# Patient Record
Sex: FEMALE | Race: OTHER | URBAN - METROPOLITAN AREA
[De-identification: names, ages, dates, MRNs, and addresses within clinical notes are randomized per-mention and may not be internally consistent; named-entity substitution may affect disease eponyms.]

---

## 2022-04-11 ENCOUNTER — OFFICE VISIT (OUTPATIENT)
Dept: FAMILY MEDICINE CLINIC | Age: 32
End: 2022-04-11
Payer: COMMERCIAL

## 2022-04-11 VITALS
WEIGHT: 126 LBS | HEIGHT: 62 IN | DIASTOLIC BLOOD PRESSURE: 86 MMHG | BODY MASS INDEX: 23.19 KG/M2 | OXYGEN SATURATION: 99 % | SYSTOLIC BLOOD PRESSURE: 138 MMHG | HEART RATE: 134 BPM

## 2022-04-11 DIAGNOSIS — E03.9 ACQUIRED HYPOTHYROIDISM: ICD-10-CM

## 2022-04-11 DIAGNOSIS — Z00.00 ANNUAL PHYSICAL EXAM: Primary | ICD-10-CM

## 2022-04-11 PROCEDURE — 99385 PREV VISIT NEW AGE 18-39: CPT | Performed by: FAMILY MEDICINE

## 2022-04-11 SDOH — ECONOMIC STABILITY: FOOD INSECURITY: WITHIN THE PAST 12 MONTHS, THE FOOD YOU BOUGHT JUST DIDN'T LAST AND YOU DIDN'T HAVE MONEY TO GET MORE.: NEVER TRUE

## 2022-04-11 SDOH — ECONOMIC STABILITY: FOOD INSECURITY: WITHIN THE PAST 12 MONTHS, YOU WORRIED THAT YOUR FOOD WOULD RUN OUT BEFORE YOU GOT MONEY TO BUY MORE.: NEVER TRUE

## 2022-04-11 ASSESSMENT — PATIENT HEALTH QUESTIONNAIRE - PHQ9
1. LITTLE INTEREST OR PLEASURE IN DOING THINGS: 0
SUM OF ALL RESPONSES TO PHQ QUESTIONS 1-9: 0
2. FEELING DOWN, DEPRESSED OR HOPELESS: 0
SUM OF ALL RESPONSES TO PHQ QUESTIONS 1-9: 0
SUM OF ALL RESPONSES TO PHQ9 QUESTIONS 1 & 2: 0

## 2022-04-11 ASSESSMENT — SOCIAL DETERMINANTS OF HEALTH (SDOH): HOW HARD IS IT FOR YOU TO PAY FOR THE VERY BASICS LIKE FOOD, HOUSING, MEDICAL CARE, AND HEATING?: NOT HARD AT ALL

## 2022-04-11 NOTE — PROGRESS NOTES
Chief Complaint: New Patient       HPI: Here for establishing. She has history of hypothyroidism and she has been taking Synthroid 125 mcg. She has got this medication from [facility]. Last lab work normal    Denies any concerns today   She is due for GYN exam.  But does not want to get it done today      Patient's problem list, medications, allergies, past medical, surgical, social and family histories were reviewed and updated as appropriate. Current Outpatient Medications   Medication Sig Dispense Refill    Multiple Vitamins-Minerals (MULTIVITAL-M PO) Take by mouth       No current facility-administered medications for this visit. Social History     Tobacco Use    Smoking status: Never Smoker    Smokeless tobacco: Never Used   Substance Use Topics    Alcohol use: Not on file            Review of Systems:  Constitutional: Negative for appetite change, fatigue, fever and unexpected weight change. HENT: Negative  Respiratory: Negative for cough, chest tightness, shortness of breath and wheezing. Cardiovascular: Negative for chest pain, palpitations and leg swelling. Gastrointestinal: Negative for abdominal pain, blood in stool, constipation, diarrhea, nausea and vomiting. Genitourinary: Negative for difficulty urinating, flank pain, frequency, hematuria and urgency. Musculoskeletal: Negative for gait problem, joint swelling and myalgias. Skin: Negative for color change, pallor, rash and wound. Neurological: Negative for dizziness, tremors, seizures, syncope, facial asymmetry, speech difficulty, weakness, light-headedness, numbness and headaches. Psychiatric/Behavioral: Negative for agitation, confusion, self-injury, sleep disturbance and suicidal ideas. The patient is not nervous/anxious.         Objective:     Vitals:    04/11/22 1133   BP: 138/86   Site: Right Upper Arm   Position: Sitting   Cuff Size: Medium Adult   Pulse: 134   SpO2: 99%   Weight: 126 lb (57.2 kg)   Height: 5' 2\" (1.575 m)     Body mass index is 23.05 kg/m². Wt Readings from Last 3 Encounters:   04/11/22 126 lb (57.2 kg)     BP Readings from Last 3 Encounters:   04/11/22 138/86       Physical exam:  Constitutional: she is oriented to person, place, and time. she appears well-developed and well-nourished. No distress. HENT:   Head: Normocephalic. Right Ear: External ear normal. Normal TM   Left Ear: External ear normal. Normal TM  Nose: Nose normal.   Mouth/Throat: Oropharynx is clear and moist. No oropharyngeal exudate. Eyes: Conjunctivae and EOM are normal. Pupils are equal, round, and reactive to light. Right eye exhibits no discharge. Left eye exhibits no discharge. No scleral icterus. Neck: Normal range of motion. No JVD present. No tracheal deviation present. No thyromegaly present. Cardiovascular: Normal rate, regular rhythm, normal heart sounds and intact distal pulses. No murmur heard. Pulmonary/Chest: Effort normal and breath sounds normal. No stridor. No respiratory distress. she has no wheezes. she has no rales. sheexhibits no tenderness. Abdominal: Soft. Bowel sounds are normal. she exhibits no distension and no mass. There is no tenderness. There is no rebound and no guarding. Musculoskeletal: Normal range of motion. she exhibits no edema, tenderness or deformity. Lymphadenopathy:     she has no cervical adenopathy. Neurological:she is alert and oriented to person, place, and time. she  has normal reflexes. No cranial nerve deficit. Coordination normal.   Skin: Skin is warm and dry. No rash noted. she is not diaphoretic. No erythema. No pallor. Psychiatric: she has a normal mood and affect.  her   behavior is normal.       Health Maintenance   Topic Date Due    Hepatitis C screen  Never done    Varicella vaccine (1 of 2 - 2-dose childhood series) Never done    COVID-19 Vaccine (1) Never done    Depression Screen  Never done    HIV screen  Never done    DTaP/Tdap/Td vaccine (1 - Tdap) Never done    Cervical cancer screen  Never done    Flu vaccine (Season Ended) 09/01/2022    Hepatitis A vaccine  Aged Out    Hepatitis B vaccine  Aged Out    Hib vaccine  Aged Out    Meningococcal (ACWY) vaccine  Aged Out    Pneumococcal 0-64 years Vaccine  Aged Out          Assessment/Plan:     1. Annual physical exam  - CBC with Auto Differential; Future  - Basic Metabolic Panel; Future  - Lipid Panel; Future    2. Acquired hypothyroidism  Currently taking synthroid 125 mcg daily   - TSH with Reflex;  Future         Tex Aguila MD  4/11/2022 11:59 AM

## 2022-04-13 DIAGNOSIS — Z00.00 ANNUAL PHYSICAL EXAM: ICD-10-CM

## 2022-04-13 DIAGNOSIS — E03.9 ACQUIRED HYPOTHYROIDISM: ICD-10-CM

## 2022-04-13 LAB
ANION GAP SERPL CALCULATED.3IONS-SCNC: 17 MMOL/L (ref 3–16)
ANISOCYTOSIS: ABNORMAL
BASOPHILS ABSOLUTE: 0 K/UL (ref 0–0.2)
BASOPHILS RELATIVE PERCENT: 0.4 %
BUN BLDV-MCNC: 8 MG/DL (ref 7–20)
CALCIUM SERPL-MCNC: 9.8 MG/DL (ref 8.3–10.6)
CHLORIDE BLD-SCNC: 101 MMOL/L (ref 99–110)
CHOLESTEROL, TOTAL: 191 MG/DL (ref 0–199)
CO2: 20 MMOL/L (ref 21–32)
CREAT SERPL-MCNC: <0.5 MG/DL (ref 0.6–1.1)
EOSINOPHILS ABSOLUTE: 0.2 K/UL (ref 0–0.6)
EOSINOPHILS RELATIVE PERCENT: 3.7 %
GFR AFRICAN AMERICAN: >60
GFR NON-AFRICAN AMERICAN: >60
GLUCOSE BLD-MCNC: 94 MG/DL (ref 70–99)
HCT VFR BLD CALC: 33.3 % (ref 36–48)
HDLC SERPL-MCNC: 38 MG/DL (ref 40–60)
HEMATOLOGY PATH CONSULT: YES
HEMOGLOBIN: 10.2 G/DL (ref 12–16)
LDL CHOLESTEROL CALCULATED: 128 MG/DL
LYMPHOCYTES ABSOLUTE: 1.5 K/UL (ref 1–5.1)
LYMPHOCYTES RELATIVE PERCENT: 31.5 %
MCH RBC QN AUTO: 18.3 PG (ref 26–34)
MCHC RBC AUTO-ENTMCNC: 30.5 G/DL (ref 31–36)
MCV RBC AUTO: 60.2 FL (ref 80–100)
MICROCYTES: ABNORMAL
MONOCYTES ABSOLUTE: 0.3 K/UL (ref 0–1.3)
MONOCYTES RELATIVE PERCENT: 7.1 %
NEUTROPHILS ABSOLUTE: 2.8 K/UL (ref 1.7–7.7)
NEUTROPHILS RELATIVE PERCENT: 57.3 %
OVALOCYTES: ABNORMAL
PDW BLD-RTO: 16.7 % (ref 12.4–15.4)
PLATELET # BLD: 253 K/UL (ref 135–450)
PMV BLD AUTO: 8.6 FL (ref 5–10.5)
POIKILOCYTES: ABNORMAL
POTASSIUM SERPL-SCNC: 4.5 MMOL/L (ref 3.5–5.1)
RBC # BLD: 5.54 M/UL (ref 4–5.2)
SCHISTOCYTES: ABNORMAL
SODIUM BLD-SCNC: 138 MMOL/L (ref 136–145)
TRIGL SERPL-MCNC: 124 MG/DL (ref 0–150)
VLDLC SERPL CALC-MCNC: 25 MG/DL
WBC # BLD: 4.9 K/UL (ref 4–11)

## 2022-04-14 LAB
HEMATOLOGY PATH CONSULT: NORMAL
T3 TOTAL: 0.97 NG/ML (ref 0.8–2)
T4 FREE: 1.6 NG/DL (ref 0.9–1.8)
TSH REFLEX: 0.03 UIU/ML (ref 0.27–4.2)

## 2022-04-14 RX ORDER — LEVOTHYROXINE SODIUM 0.1 MG/1
100 TABLET ORAL DAILY
Qty: 90 TABLET | Refills: 1 | COMMUNITY
Start: 2022-04-14

## 2024-03-16 SDOH — HEALTH STABILITY: PHYSICAL HEALTH: ON AVERAGE, HOW MANY DAYS PER WEEK DO YOU ENGAGE IN MODERATE TO STRENUOUS EXERCISE (LIKE A BRISK WALK)?: 3 DAYS

## 2024-03-16 SDOH — HEALTH STABILITY: PHYSICAL HEALTH: ON AVERAGE, HOW MANY MINUTES DO YOU ENGAGE IN EXERCISE AT THIS LEVEL?: 30 MIN

## 2024-03-18 ENCOUNTER — OFFICE VISIT (OUTPATIENT)
Dept: PRIMARY CARE CLINIC | Age: 34
End: 2024-03-18
Payer: COMMERCIAL

## 2024-03-18 VITALS
SYSTOLIC BLOOD PRESSURE: 158 MMHG | OXYGEN SATURATION: 97 % | BODY MASS INDEX: 24.17 KG/M2 | HEIGHT: 61 IN | RESPIRATION RATE: 12 BRPM | WEIGHT: 128 LBS | HEART RATE: 128 BPM | TEMPERATURE: 97.6 F | DIASTOLIC BLOOD PRESSURE: 70 MMHG

## 2024-03-18 DIAGNOSIS — I10 ESSENTIAL HYPERTENSION: Primary | Chronic | ICD-10-CM

## 2024-03-18 DIAGNOSIS — E61.1 IRON DEFICIENCY: Chronic | ICD-10-CM

## 2024-03-18 DIAGNOSIS — E03.9 ACQUIRED HYPOTHYROIDISM: ICD-10-CM

## 2024-03-18 DIAGNOSIS — N92.6 MENSTRUAL BLEEDING PROBLEM: Chronic | ICD-10-CM

## 2024-03-18 PROCEDURE — 3078F DIAST BP <80 MM HG: CPT | Performed by: INTERNAL MEDICINE

## 2024-03-18 PROCEDURE — 3077F SYST BP >= 140 MM HG: CPT | Performed by: INTERNAL MEDICINE

## 2024-03-18 PROCEDURE — 99214 OFFICE O/P EST MOD 30 MIN: CPT | Performed by: INTERNAL MEDICINE

## 2024-03-18 RX ORDER — METOPROLOL SUCCINATE 50 MG/1
50 TABLET, EXTENDED RELEASE ORAL DAILY
Qty: 30 TABLET | Refills: 5 | Status: SHIPPED | OUTPATIENT
Start: 2024-03-18

## 2024-03-18 ASSESSMENT — PATIENT HEALTH QUESTIONNAIRE - PHQ9
2. FEELING DOWN, DEPRESSED OR HOPELESS: NOT AT ALL
SUM OF ALL RESPONSES TO PHQ QUESTIONS 1-9: 0
1. LITTLE INTEREST OR PLEASURE IN DOING THINGS: NOT AT ALL
SUM OF ALL RESPONSES TO PHQ QUESTIONS 1-9: 0
SUM OF ALL RESPONSES TO PHQ QUESTIONS 1-9: 0
SUM OF ALL RESPONSES TO PHQ9 QUESTIONS 1 & 2: 0
SUM OF ALL RESPONSES TO PHQ QUESTIONS 1-9: 0

## 2024-03-18 NOTE — ASSESSMENT & PLAN NOTE
On synthroid,  Patient is compliant w medications, no side effects, effective, provides adequate symptom relief. No new symptoms or problems as noted by patient.  The problem is stable, no changes noted by patient. Will consider monitoring labs and refill medications as appropriate. Patient counseled and will continue current plan.

## 2024-03-18 NOTE — PROGRESS NOTES
Subjective:      Patient ID: Khai Tatum is a 33 y.o. female.    HPI  Established patient here for a visit to manage acute and chronic medical conditions as detailed below.    Essential hypertension  This is a longstanding problem,  Was on metoprolol ,   Hasn't been taking it,   Counseled,   Counseled patient regarding diet and lifestyle modification.    Patient counseled,   Encouraged to lose weight, watch diet and exercise consistently.    Will restart metoprolol,            Acquired hypothyroidism  On synthroid,  Patient is compliant w medications, no side effects, effective, provides adequate symptom relief. No new symptoms or problems as noted by patient.  The problem is stable, no changes noted by patient. Will consider monitoring labs and refill medications as appropriate. Patient counseled and will continue current plan.      Iron deficiency  Not taking iron   Counseled.   Will check blood work     Menstrual bleeding problem  Will refer to gynec.     Review of Systems  ROS: No unusual headaches or allergy symptoms or blurred vision. No prolonged cough. No flushing or facial pain or chest pain,dizziness, dyspnea, palpitations, or chest pain on exertion. No syncope. No nausea or vommitting or diarrhea.  No jaundice or abdominal pain, change in bowel habits, black or bloody stools.  No dysuria or hematuria or frequency of urination.   No myalgias or muscle pain.  No numbness, weakness, or tingling. No falls, or loss of consciousness. No weight loss or back pain. No falls.  No paresthesias. No joint swelling or redness. No joint pain. No recent weight loss. No focal weakness or sensory deficits or paresthesias, No confusion or altered sensorium. No hematemesis. No hearing loss. No siezures. All other systems were reviewed, and review was negative.   Objective:   Physical Exam  BP (!) 158/70 (Site: Left Upper Arm, Position: Sitting, Cuff Size: Medium Adult)   Pulse (!) 128   Temp 97.6 °F (36.4 °C)   Resp 12

## 2024-03-18 NOTE — ASSESSMENT & PLAN NOTE
This is a longstanding problem,  Was on metoprolol ,   Hasn't been taking it,   Counseled,   Counseled patient regarding diet and lifestyle modification.    Patient counseled,   Encouraged to lose weight, watch diet and exercise consistently.    Will restart metoprolol,

## 2024-10-25 RX ORDER — METOPROLOL SUCCINATE 50 MG/1
50 TABLET, EXTENDED RELEASE ORAL DAILY
Qty: 30 TABLET | Refills: 0 | Status: SHIPPED | OUTPATIENT
Start: 2024-10-25

## 2024-10-25 NOTE — TELEPHONE ENCOUNTER
Medication:   Requested Prescriptions     Pending Prescriptions Disp Refills    metoprolol succinate (TOPROL XL) 50 MG extended release tablet [Pharmacy Med Name: Metoprolol Succinate ER 50 MG Oral Tablet Extended Release 24 Hour] 30 tablet 0     Sig: Take 1 tablet by mouth once daily     Last Filled:  03/18/2024    Last appt: 3/18/2024   Next appt: 11/1/2024    Last OARRS:        No data to display

## 2024-11-01 ENCOUNTER — OFFICE VISIT (OUTPATIENT)
Dept: PRIMARY CARE CLINIC | Age: 34
End: 2024-11-01
Payer: COMMERCIAL

## 2024-11-01 VITALS
OXYGEN SATURATION: 99 % | HEIGHT: 61 IN | DIASTOLIC BLOOD PRESSURE: 72 MMHG | SYSTOLIC BLOOD PRESSURE: 130 MMHG | BODY MASS INDEX: 24.81 KG/M2 | TEMPERATURE: 97.1 F | HEART RATE: 64 BPM | WEIGHT: 131.4 LBS

## 2024-11-01 DIAGNOSIS — E03.9 ACQUIRED HYPOTHYROIDISM: ICD-10-CM

## 2024-11-01 DIAGNOSIS — Z00.00 PHYSICAL EXAM, ANNUAL: Primary | ICD-10-CM

## 2024-11-01 DIAGNOSIS — E61.1 IRON DEFICIENCY: Chronic | ICD-10-CM

## 2024-11-01 DIAGNOSIS — E55.9 VITAMIN D DEFICIENCY: ICD-10-CM

## 2024-11-01 DIAGNOSIS — N92.6 MENSTRUAL BLEEDING PROBLEM: Chronic | ICD-10-CM

## 2024-11-01 DIAGNOSIS — I10 ESSENTIAL HYPERTENSION: Chronic | ICD-10-CM

## 2024-11-01 PROCEDURE — 3078F DIAST BP <80 MM HG: CPT | Performed by: INTERNAL MEDICINE

## 2024-11-01 PROCEDURE — 99395 PREV VISIT EST AGE 18-39: CPT | Performed by: INTERNAL MEDICINE

## 2024-11-01 PROCEDURE — 3075F SYST BP GE 130 - 139MM HG: CPT | Performed by: INTERNAL MEDICINE

## 2024-11-01 RX ORDER — METOPROLOL SUCCINATE 50 MG/1
50 TABLET, EXTENDED RELEASE ORAL DAILY
Qty: 90 TABLET | Refills: 3 | Status: SHIPPED | OUTPATIENT
Start: 2024-11-01

## 2024-11-01 RX ORDER — MULTIVIT-MIN/IRON/FOLIC ACID/K 18-600-40
2000 CAPSULE ORAL DAILY
COMMUNITY

## 2024-11-01 SDOH — ECONOMIC STABILITY: INCOME INSECURITY: HOW HARD IS IT FOR YOU TO PAY FOR THE VERY BASICS LIKE FOOD, HOUSING, MEDICAL CARE, AND HEATING?: NOT HARD AT ALL

## 2024-11-01 SDOH — ECONOMIC STABILITY: FOOD INSECURITY: WITHIN THE PAST 12 MONTHS, THE FOOD YOU BOUGHT JUST DIDN'T LAST AND YOU DIDN'T HAVE MONEY TO GET MORE.: NEVER TRUE

## 2024-11-01 SDOH — ECONOMIC STABILITY: FOOD INSECURITY: WITHIN THE PAST 12 MONTHS, YOU WORRIED THAT YOUR FOOD WOULD RUN OUT BEFORE YOU GOT MONEY TO BUY MORE.: NEVER TRUE

## 2024-11-01 NOTE — PROGRESS NOTES
negative  Gastrointestinal: negative  Genitourinary: negative  Musculoskeletal: negative  Neurologic: negative  Psychiatric: negative  Hematologic/Lymphatic/Immunologic: negative  Endocrine: negative       Objective :     /72 (Site: Right Upper Arm, Position: Sitting, Cuff Size: Medium Adult)   Pulse 64   Temp 97.1 °F (36.2 °C) (Infrared)   Ht 1.56 m (5' 1.42\")   Wt 59.6 kg (131 lb 6.4 oz)   LMP 09/03/2024 (Exact Date)   SpO2 99%   BMI 24.49 kg/m²   General appearance - healthy, alert, no distress  Skin - Skin color, texture, turgor normal. No rashes or lesions.  Head - Normocephalic. No masses, lesions, tenderness or abnormalities  Eyes - conjunctivae/corneas clear. PERRL, EOM's intact.  Ears - External ears normal. Canals clear. TM's normal.  Nose/Sinuses - Nares normal. Septum midline. Mucosa normal. No drainage or sinus tenderness.  Oropharynx - Lips, mucosa, and tongue normal. Teeth and gums normal. Oropharynx pink and patent  Neck - Neck supple. No adenopathy. Thyroid symmetric, normal size,  Back - Back symmetric, no curvature. ROM normal. No CVA tenderness.  Lungs - Percussion normal. Good diaphragmatic excursion. Lungs clear  Heart - Regular rate and rhythm, with no rub, murmur or gallop noted.  Abdomen - Abdomen soft, non-tender. BS normal. No masses, organomegaly  Extremities - Extremities normal. No deformities, edema, or skin discolora  Musculoskeletal - Spine ROM normal. Muscular strength intact.  Peripheral pulses - radial=2+,, femoral=2+, popliteal=2+, dorsalis pedis=2+,  Neuro - Gait normal. Reflexes normal and symmetric. Sensation grossly normal.  No focal weakness       Assessment :     Physical exam  Essential hypertension  This is a chronic problem. The problem is well controlled.  Patient monitors readings regularly. Pertinent negatives include no chest pain, focal sensory loss, focal weakness, leg pain, myalgias or shortness of breath. No headaches or chest pain. Takes medications